# Patient Record
Sex: FEMALE | Race: BLACK OR AFRICAN AMERICAN | Employment: FULL TIME | URBAN - METROPOLITAN AREA
[De-identification: names, ages, dates, MRNs, and addresses within clinical notes are randomized per-mention and may not be internally consistent; named-entity substitution may affect disease eponyms.]

---

## 2021-10-19 ENCOUNTER — OFFICE VISIT (OUTPATIENT)
Dept: ORTHOPEDIC SURGERY | Age: 36
End: 2021-10-19
Payer: COMMERCIAL

## 2021-10-19 VITALS
HEIGHT: 62 IN | WEIGHT: 182 LBS | HEART RATE: 105 BPM | TEMPERATURE: 98 F | OXYGEN SATURATION: 98 % | BODY MASS INDEX: 33.49 KG/M2

## 2021-10-19 DIAGNOSIS — M76.61 ACHILLES TENDINITIS OF RIGHT LOWER EXTREMITY: ICD-10-CM

## 2021-10-19 DIAGNOSIS — M79.672 LEFT FOOT PAIN: ICD-10-CM

## 2021-10-19 DIAGNOSIS — B35.3 TINEA PEDIS OF RIGHT FOOT: Primary | ICD-10-CM

## 2021-10-19 DIAGNOSIS — M79.671 RIGHT FOOT PAIN: ICD-10-CM

## 2021-10-19 DIAGNOSIS — M72.2 PLANTAR FASCIA SYNDROME: ICD-10-CM

## 2021-10-19 PROCEDURE — 99203 OFFICE O/P NEW LOW 30 MIN: CPT | Performed by: ORTHOPAEDIC SURGERY

## 2021-10-19 PROCEDURE — 73630 X-RAY EXAM OF FOOT: CPT | Performed by: ORTHOPAEDIC SURGERY

## 2021-10-19 RX ORDER — TOLNAFTATE 10 MG/G
CREAM TOPICAL 3 TIMES DAILY
Qty: 15 G | Refills: 0 | Status: SHIPPED | OUTPATIENT
Start: 2021-10-19

## 2021-10-19 RX ORDER — MEDROXYPROGESTERONE ACETATE 150 MG/ML
INJECTION, SUSPENSION INTRAMUSCULAR
COMMUNITY
Start: 2021-10-11

## 2021-10-19 RX ORDER — MELOXICAM 15 MG/1
15 TABLET ORAL DAILY
Qty: 30 TABLET | Refills: 1 | Status: SHIPPED | OUTPATIENT
Start: 2021-10-19

## 2021-10-19 RX ORDER — SERTRALINE HYDROCHLORIDE 50 MG/1
TABLET, FILM COATED ORAL
COMMUNITY
Start: 2021-07-23

## 2021-10-19 RX ORDER — FAMOTIDINE 40 MG/1
40 TABLET, FILM COATED ORAL DAILY
Qty: 30 TABLET | Refills: 1 | Status: SHIPPED | OUTPATIENT
Start: 2021-10-19 | End: 2021-11-11 | Stop reason: SDUPTHER

## 2021-10-19 RX ORDER — TRIAMCINOLONE ACETONIDE 1 MG/G
CREAM TOPICAL
COMMUNITY
Start: 2021-08-26

## 2021-10-19 RX ORDER — NAPROXEN 500 MG/1
500 TABLET ORAL 2 TIMES DAILY
COMMUNITY
Start: 2021-02-02 | End: 2021-10-19 | Stop reason: ALTCHOICE

## 2021-10-19 NOTE — LETTER
NOTIFICATION     10/19/2021 1:38 PM    [address]  [address]  [address] [facility]      To Whom It May Concern:    Celso Borden is currently under the care of [address] [place] [address]. Celso Borden was seen and evaluated by me on 10/19/2021. If there are questions or concerns please have the patient contact our office.         Sincerely,      Jamal Luis MD
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
## 2021-11-11 DIAGNOSIS — M79.671 RIGHT FOOT PAIN: ICD-10-CM

## 2021-11-11 DIAGNOSIS — M79.672 LEFT FOOT PAIN: ICD-10-CM

## 2021-11-11 RX ORDER — FAMOTIDINE 40 MG/1
TABLET, FILM COATED ORAL
Qty: 30 TABLET | Refills: 1 | Status: SHIPPED | OUTPATIENT
Start: 2021-11-11